# Patient Record
Sex: MALE | Race: WHITE | NOT HISPANIC OR LATINO | Employment: FULL TIME | URBAN - METROPOLITAN AREA
[De-identification: names, ages, dates, MRNs, and addresses within clinical notes are randomized per-mention and may not be internally consistent; named-entity substitution may affect disease eponyms.]

---

## 2018-09-06 ENCOUNTER — OFFICE VISIT (OUTPATIENT)
Dept: NEUROLOGY | Facility: CLINIC | Age: 23
End: 2018-09-06

## 2018-09-06 VITALS
SYSTOLIC BLOOD PRESSURE: 170 MMHG | OXYGEN SATURATION: 98 % | WEIGHT: 234 LBS | DIASTOLIC BLOOD PRESSURE: 99 MMHG | HEIGHT: 71 IN | BODY MASS INDEX: 32.76 KG/M2 | HEART RATE: 64 BPM

## 2018-09-06 DIAGNOSIS — R55 SYNCOPE AND COLLAPSE: Primary | ICD-10-CM

## 2018-09-06 PROCEDURE — 99243 OFF/OP CNSLTJ NEW/EST LOW 30: CPT | Performed by: PSYCHIATRY & NEUROLOGY

## 2018-09-06 RX ORDER — TRAZODONE HYDROCHLORIDE 50 MG/1
TABLET ORAL
COMMUNITY
Start: 2018-08-29 | End: 2018-09-18

## 2018-09-06 RX ORDER — CETIRIZINE HYDROCHLORIDE 10 MG/1
10 TABLET ORAL DAILY
COMMUNITY
End: 2018-09-18

## 2018-09-06 NOTE — PROGRESS NOTES
Subjective:     Patient ID: Cory Farias is a 23 y.o. male.    History of Present Illness     The patient is a 23-year-old right-handed young gentleman who was seen for further evaluation of episodes of passing out. The patient was seen today in consultation per the request of Dr. Healy.   He's had 3 episodes over the past few months.  The first occurred in May or June.  He was at his desk at work when he suddenly had some tunnel vision followed by hitting the floor this was witnessed and he was apparently out for just 2-3 seconds.  He felt fine afterwards.  There was no tongue biting or urinary incontinence.  The second episode occurred 2 weeks later.  He was in the shower and the same thing happened exactly.  The third episode occurred while he was running in [place] he hit the ground on that occasion he was out only briefly.  He's not had any neurodiagnostic studies.  With the episodes there was no shortness of breath palpitations or chest pain.  The following portions of the patient's history were reviewed and updated as appropriate: allergies, current medications, past family history, past medical history, past social history, past surgical history and problem list.    Family History   Problem Relation Age of Onset   • Arthritis Mother    • Hypertension Father    • Hypertension Sister    • Dementia Maternal Grandmother    • Thyroid disease Maternal Grandmother    • Hypertension Maternal Grandfather    • Hypertension Paternal Grandmother    • Hypertension Paternal Grandfather      Active Ambulatory Problems     Diagnosis Date Noted   • Syncope and collapse 09/06/2018     Resolved Ambulatory Problems     Diagnosis Date Noted   • No Resolved Ambulatory Problems     Past Medical History:   Diagnosis Date   • Head injury    • Seasonal allergies      Social History     Social History   • Marital status: Single     Spouse name: N/A   • Number of children: N/A   • Years of education: N/A     Occupational  History   • Not on file.     Social History Main Topics   • Smoking status: Never Smoker   • Smokeless tobacco: Current User     Types: Snuff   • Alcohol use Yes   • Drug use: Unknown   • Sexual activity: Not on file     Other Topics Concern   • Not on file     Social History Narrative   • No narrative on file       Current Outpatient Prescriptions:   •  cetirizine (zyrTEC) 10 MG tablet, Take 10 mg by mouth Daily., Disp: , Rfl:   •  traZODone (DESYREL) 50 MG tablet, , Disp: , Rfl:     Review of Systems   Constitutional: Negative.    HENT: Positive for rhinorrhea, sinus pain and sinus pressure. Negative for congestion, dental problem, drooling, ear discharge, ear pain, facial swelling, hearing loss, mouth sores, nosebleeds, postnasal drip, sneezing, sore throat, tinnitus, trouble swallowing and voice change.    Eyes: Positive for itching. Negative for photophobia, pain, discharge, redness and visual disturbance.   Respiratory: Negative.    Cardiovascular: Negative.    Endocrine: Negative.    Musculoskeletal: Negative.    Skin: Negative.    Allergic/Immunologic: Positive for environmental allergies and food allergies. Negative for immunocompromised state.   Neurological: Positive for dizziness. Negative for tremors, seizures, syncope, facial asymmetry, speech difficulty, weakness, light-headedness, numbness and headaches.   Hematological: Negative.    Psychiatric/Behavioral: Positive for sleep disturbance. Negative for agitation, behavioral problems, confusion, decreased concentration, dysphoric mood, hallucinations, self-injury and suicidal ideas. The patient is not nervous/anxious and is not hyperactive.         Objective:    Neurologic Exam  General appearance is normal. Mental status showed normal orientation, memory, attention span and concentration, language, and fund of knowledge for age.    Cranial nerve exam- funduscopy, eye movements, pupillary reflexes, muscles of mastication, facial musculature, hearing,  palatal movement, shoulder shrug and tongue protrusion were all normal.    Sensory examination was normal.  Deep tendon reflexes were 2+ and symmetric.    No pathologic reflexes were noted.  Cerebellar function was normal.  No focal weakness was noted.  Tone was normal.  Gait and station were normal.  No edema was noted.      Physical Exam    Assessment/Plan:     Cory was seen today for syncope.    Diagnoses and all orders for this visit:    Syncope and collapse  -     EEG (Hospital Performed); Future  -     Ambulatory Referral to Cardiology       The patient has had 3 episodes of presumed syncope.  I am skeptical that there is a neurological cause.  I will set up an EEG.  Also I've referred him to cardiology is a me needs at least an echo and Holter.  I sent him to [facility] in [place].  He is moving to [place].  He will call me following the EEG.  Neurologic examination is normal.  I placed him on no medicine. Thank you for allowing me to share in the care of this patient.  Rafita Joiner M.D.

## 2018-09-07 ENCOUNTER — TELEPHONE (OUTPATIENT)
Dept: NEUROLOGY | Facility: CLINIC | Age: 23
End: 2018-09-07

## 2018-09-07 DIAGNOSIS — R55 SYNCOPE AND COLLAPSE: Primary | ICD-10-CM

## 2018-09-07 NOTE — TELEPHONE ENCOUNTER
----- Message from Irina Gustafson sent at 9/7/2018  9:26 AM EDT -----  Contact: PT  Cory was in to see Dr. Joiner, he put in a referral for pt to see a cardiologist in [place] but put it in for [facility].  Please have Dr. Joiner to correct it and call pt at [phone]

## 2018-09-12 ENCOUNTER — HOSPITAL ENCOUNTER (OUTPATIENT)
Dept: NEUROLOGY | Facility: HOSPITAL | Age: 23
Discharge: HOME | End: 2018-09-12
Attending: PSYCHIATRY & NEUROLOGY | Admitting: PSYCHIATRY & NEUROLOGY

## 2018-09-12 DIAGNOSIS — R55 SYNCOPE AND COLLAPSE: ICD-10-CM

## 2018-09-12 PROCEDURE — 95816 EEG AWAKE AND DROWSY: CPT | Performed by: PSYCHIATRY & NEUROLOGY

## 2018-09-12 PROCEDURE — 95816 EEG AWAKE AND DROWSY: CPT

## 2018-09-17 ENCOUNTER — TELEPHONE (OUTPATIENT)
Dept: CARDIOLOGY | Facility: CLINIC | Age: 23
End: 2018-09-17

## 2018-09-17 NOTE — PROGRESS NOTES
[facility] at [facility]  Consultation History and Physical  Cory Farias  1995  [phone]  VISIT DATE:  18    PCP:   Jun Healy MD  [address] [place] [address]  [facility] [id number]      CC:  Evaluation of syncope  Problem List:    History of Present Illness:  Cory Farias  Is a 23 y.o. male with pertinent cardiac history detailed above.  The patient has a history of 3 total episodes of syncope over the last few months.  He seen neurology and an EEG is pending.  Patient states that 3 episodes of occurred since April.  The first occurred while he was sitting and eating lunch with a coworker he felt up very brief sensation of tunnel vision lasting only a few seconds, then had loss of consciousness, he states for only a few seconds.  The second episode occurred while in the shower he had been in the shower for approximately 5 minutes, had a very brief sensation of tunnel vision and again had loss of consciousness.  The most concerning episode occurred approximately 2 months ago, while running, he had no prodrome, and fell injuring his leg and arm.  A bystander saw him, and came over him quickly, and reported he was only out for a few seconds.  He denies any chest pain or shortness of breath.  His EKG in the office today was abnormal and that has a vertical axis, with some T-wave inversion in 3 and aVF.  There is no evidence of Brugada syndrome, QT is normal, no delta wave visible.  Family history of syncope or sudden cardiac death.  He states his mom has mitral valve prolapse.  He states he has an uncle who  in his 50s from a reported MI      Patient Active Problem List    Diagnosis Date Noted   • Syncope and collapse 2018       Allergies   Allergen Reactions   • Trazodone Other (See Comments)     PATIENT SAYS MADE HIS HEART RACE REALLY BAD       Social History     Social History   • Marital status: Single     Spouse name: N/A   •  "Number of children: N/A   • Years of education: N/A     Occupational History   • Not on file.     Social History Main Topics   • Smoking status: Never Smoker   • Smokeless tobacco: Current User     Types: Snuff   • Alcohol use Yes      Comment: social   • Drug use: No   • Sexual activity: Defer     Other Topics Concern   • Not on file     Social History Narrative   • No narrative on file       Family History   Problem Relation Age of Onset   • Arthritis Mother    • Hypertension Father    • Hypertension Sister    • Dementia Maternal Grandmother    • Thyroid disease Maternal Grandmother    • Hypertension Maternal Grandfather    • Hypertension Paternal Grandmother    • Hypertension Paternal Grandfather        Current Medications:    Current Outpatient Prescriptions:   •  fexofenadine (ALLEGRA) 180 MG tablet, Take 180 mg by mouth Daily., Disp: , Rfl:      Review of Systems   Cardiovascular: Positive for syncope. Negative for chest pain and palpitations.   Respiratory: Negative for shortness of breath.    Neurological: Negative for seizures.   All other systems reviewed and are negative.      Vitals:    09/18/18 0904 09/18/18 0914   BP: 124/74 110/72   BP Location: Left arm Left arm   Patient Position: Sitting Standing   Pulse: 76 78   Weight: 106 kg (234 lb)    Height: 180.3 cm (71\")        Physical Exam   Constitutional: He is oriented to person, place, and time. He appears well-developed and well-nourished.   HENT:   Head: Normocephalic and atraumatic.   Eyes: Pupils are equal, round, and reactive to light. EOM are normal.   Neck: Normal range of motion. Neck supple.   Cardiovascular: Normal rate, regular rhythm and normal heart sounds.  Exam reveals no gallop and no friction rub.    No murmur heard.  Pulmonary/Chest: Effort normal and breath sounds normal.   Abdominal: Soft. Bowel sounds are normal. There is no tenderness.   Musculoskeletal: Normal range of motion. He exhibits no edema.   Neurological: He is alert " and oriented to person, place, and time.   Skin: Skin is warm and dry.   Psychiatric: He has a normal mood and affect.       Diagnostic Data:    ECG 12 Lead  Date/Time: 9/18/2018 9:27 AM  Performed by: FAUSTINA OLSON  Authorized by: FAUSTINA OLSON   Previous ECG: no previous ECG available  Rhythm: sinus rhythm  Rate: normal  BPM: 86  T depression: II and III  QRS axis: vertical axis.  Clinical impression: abnormal ECG          No results found for: CHLPL, TRIG, HDL, LDLDIRECT  No results found for: GLUCOSE, BUN, CREATININE, NA, K, CL, CO2, CREATININE, BUN  No results found for: HGBA1C  No results found for: WBC, HGB, HCT, PLT    Assessment:  No diagnosis found.    Plan:      Syncope  -3 episodes, and approximately the last 5 months  -Some concern for cardiac etiology given minimal warning symptoms, and 1 episode with exertion  -EKG shows a vertical axis, but otherwise no evidence of underlying channelopathy or otherr obvious cardiac cause of syncope  -will obtain echo, place 30 day monitor, and order GXT to evaluate for inducible arrhythmias  -follow up approximately 1 month          Faustina Olson MD

## 2018-09-17 NOTE — TELEPHONE ENCOUNTER
Spoke with patient about consult appointment on 09/18/2019 @ 9:00am. Told patient to arrive 30 minutes prior to appointment and bring all medications or list of medications with him.    Patient verbalized understanding.

## 2018-09-18 ENCOUNTER — CONSULT (OUTPATIENT)
Dept: CARDIOLOGY | Facility: CLINIC | Age: 23
End: 2018-09-18

## 2018-09-18 VITALS
BODY MASS INDEX: 32.76 KG/M2 | HEART RATE: 78 BPM | SYSTOLIC BLOOD PRESSURE: 110 MMHG | DIASTOLIC BLOOD PRESSURE: 72 MMHG | WEIGHT: 234 LBS | HEIGHT: 71 IN

## 2018-09-18 DIAGNOSIS — R55 SYNCOPE, UNSPECIFIED SYNCOPE TYPE: Primary | ICD-10-CM

## 2018-09-18 PROCEDURE — 93000 ELECTROCARDIOGRAM COMPLETE: CPT | Performed by: INTERNAL MEDICINE

## 2018-09-18 PROCEDURE — 99244 OFF/OP CNSLTJ NEW/EST MOD 40: CPT | Performed by: INTERNAL MEDICINE

## 2018-09-18 RX ORDER — FEXOFENADINE HCL 180 MG/1
180 TABLET ORAL DAILY
COMMUNITY
End: 2021-04-13

## 2018-10-16 ENCOUNTER — HOSPITAL ENCOUNTER (OUTPATIENT)
Dept: CARDIOLOGY | Facility: HOSPITAL | Age: 23
Discharge: HOME | End: 2018-10-16
Attending: INTERNAL MEDICINE | Admitting: INTERNAL MEDICINE

## 2018-10-16 ENCOUNTER — HOSPITAL ENCOUNTER (OUTPATIENT)
Dept: CARDIOLOGY | Facility: HOSPITAL | Age: 23
Discharge: HOME | End: 2018-10-16
Attending: INTERNAL MEDICINE

## 2018-10-16 VITALS
WEIGHT: 234 LBS | BODY MASS INDEX: 32.76 KG/M2 | HEIGHT: 71 IN | SYSTOLIC BLOOD PRESSURE: 110 MMHG | DIASTOLIC BLOOD PRESSURE: 72 MMHG

## 2018-10-16 DIAGNOSIS — R55 SYNCOPE, UNSPECIFIED SYNCOPE TYPE: ICD-10-CM

## 2018-10-16 LAB
BH CV ECHO MEAS - AO MAX PG (FULL): 2.4 MMHG
BH CV ECHO MEAS - AO MAX PG: 7 MMHG
BH CV ECHO MEAS - AO MEAN PG (FULL): 1.5 MMHG
BH CV ECHO MEAS - AO MEAN PG: 3.7 MMHG
BH CV ECHO MEAS - AO ROOT AREA (BSA CORRECTED): 1.3
BH CV ECHO MEAS - AO ROOT AREA: 6.6 CM^2
BH CV ECHO MEAS - AO ROOT DIAM: 2.9 CM
BH CV ECHO MEAS - AO V2 MAX: 127.6 CM/SEC
BH CV ECHO MEAS - AO V2 MEAN: 89.3 CM/SEC
BH CV ECHO MEAS - AO V2 VTI: 23.9 CM
BH CV ECHO MEAS - AVA(I,A): 3.5 CM^2
BH CV ECHO MEAS - AVA(I,D): 3.5 CM^2
BH CV ECHO MEAS - AVA(V,A): 3.5 CM^2
BH CV ECHO MEAS - AVA(V,D): 3.5 CM^2
BH CV ECHO MEAS - BSA(HAYCOCK): 2.3 M^2
BH CV ECHO MEAS - BSA: 2.3 M^2
BH CV ECHO MEAS - BZI_BMI: 32.6 KILOGRAMS/M^2
BH CV ECHO MEAS - BZI_METRIC_HEIGHT: 180.3 CM
BH CV ECHO MEAS - BZI_METRIC_WEIGHT: 106.1 KG
BH CV ECHO MEAS - EDV(CUBED): 161.2 ML
BH CV ECHO MEAS - EDV(MOD-SP4): 146 ML
BH CV ECHO MEAS - EDV(TEICH): 143.9 ML
BH CV ECHO MEAS - EF(CUBED): 65 %
BH CV ECHO MEAS - EF(MOD-SP4): 52.1 %
BH CV ECHO MEAS - EF(TEICH): 55.9 %
BH CV ECHO MEAS - ESV(CUBED): 56.5 ML
BH CV ECHO MEAS - ESV(MOD-SP4): 70 ML
BH CV ECHO MEAS - ESV(TEICH): 63.4 ML
BH CV ECHO MEAS - FS: 29.5 %
BH CV ECHO MEAS - IVS/LVPW: 1.1
BH CV ECHO MEAS - IVSD: 1.1 CM
BH CV ECHO MEAS - LA DIMENSION: 4 CM
BH CV ECHO MEAS - LA/AO: 1.4
BH CV ECHO MEAS - LAD MAJOR: 5.9 CM
BH CV ECHO MEAS - LAT PEAK E' VEL: 16.7 CM/SEC
BH CV ECHO MEAS - LATERAL E/E' RATIO: 4
BH CV ECHO MEAS - LV DIASTOLIC VOL/BSA (35-75): 64.8 ML/M^2
BH CV ECHO MEAS - LV MASS(C)D: 227.7 GRAMS
BH CV ECHO MEAS - LV MASS(C)DI: 101 GRAMS/M^2
BH CV ECHO MEAS - LV MAX PG: 4.6 MMHG
BH CV ECHO MEAS - LV MEAN PG: 2.1 MMHG
BH CV ECHO MEAS - LV SYSTOLIC VOL/BSA (12-30): 31.1 ML/M^2
BH CV ECHO MEAS - LV V1 MAX: 106.9 CM/SEC
BH CV ECHO MEAS - LV V1 MEAN: 66.2 CM/SEC
BH CV ECHO MEAS - LV V1 VTI: 20.3 CM
BH CV ECHO MEAS - LVIDD: 5.4 CM
BH CV ECHO MEAS - LVIDS: 3.8 CM
BH CV ECHO MEAS - LVLD AP4: 10.3 CM
BH CV ECHO MEAS - LVLS AP4: 8.6 CM
BH CV ECHO MEAS - LVOT AREA (M): 4.2 CM^2
BH CV ECHO MEAS - LVOT AREA: 4.2 CM^2
BH CV ECHO MEAS - LVOT DIAM: 2.3 CM
BH CV ECHO MEAS - LVPWD: 1 CM
BH CV ECHO MEAS - MED PEAK E' VEL: 9.6 CM/SEC
BH CV ECHO MEAS - MEDIAL E/E' RATIO: 6.9
BH CV ECHO MEAS - MV A MAX VEL: 58 CM/SEC
BH CV ECHO MEAS - MV DEC TIME: 0.19 SEC
BH CV ECHO MEAS - MV E MAX VEL: 67.5 CM/SEC
BH CV ECHO MEAS - MV E/A: 1.2
BH CV ECHO MEAS - PA ACC SLOPE: 654.8 CM/SEC^2
BH CV ECHO MEAS - PA ACC TIME: 0.15 SEC
BH CV ECHO MEAS - PA MAX PG: 1.8 MMHG
BH CV ECHO MEAS - PA PR(ACCEL): 9.3 MMHG
BH CV ECHO MEAS - PA V2 MAX: 47.5 CM/SEC
BH CV ECHO MEAS - PI END-D VEL: 95.6 CM/SEC
BH CV ECHO MEAS - RAP SYSTOLE: 3 MMHG
BH CV ECHO MEAS - RVSP: 14 MMHG
BH CV ECHO MEAS - SI(AO): 69.8 ML/M^2
BH CV ECHO MEAS - SI(CUBED): 46.5 ML/M^2
BH CV ECHO MEAS - SI(LVOT): 37.3 ML/M^2
BH CV ECHO MEAS - SI(MOD-SP4): 33.7 ML/M^2
BH CV ECHO MEAS - SI(TEICH): 35.7 ML/M^2
BH CV ECHO MEAS - SV(AO): 157.3 ML
BH CV ECHO MEAS - SV(CUBED): 104.8 ML
BH CV ECHO MEAS - SV(LVOT): 84.2 ML
BH CV ECHO MEAS - SV(MOD-SP4): 76 ML
BH CV ECHO MEAS - SV(TEICH): 80.5 ML
BH CV ECHO MEAS - TAPSE (>1.6): 2.1 CM2
BH CV ECHO MEAS - TR MAX PG: 11 MMHG
BH CV ECHO MEAS - TR MAX VEL: 162.2 CM/SEC
BH CV ECHO MEASUREMENTS AVERAGE E/E' RATIO: 5.13
BH CV STRESS BP STAGE 1: NORMAL
BH CV STRESS BP STAGE 2: NORMAL
BH CV STRESS BP STAGE 3: NORMAL
BH CV STRESS BP STAGE 4: NORMAL
BH CV STRESS BP STAGE 5: NORMAL
BH CV STRESS DURATION MIN STAGE 1: 3
BH CV STRESS DURATION MIN STAGE 2: 3
BH CV STRESS DURATION MIN STAGE 3: 3
BH CV STRESS DURATION MIN STAGE 4: 3
BH CV STRESS DURATION MIN STAGE 5: 1
BH CV STRESS DURATION SEC STAGE 1: 0
BH CV STRESS DURATION SEC STAGE 2: 0
BH CV STRESS DURATION SEC STAGE 3: 0
BH CV STRESS DURATION SEC STAGE 4: 0
BH CV STRESS DURATION SEC STAGE 5: 0
BH CV STRESS GRADE STAGE 1: 10
BH CV STRESS GRADE STAGE 2: 12
BH CV STRESS GRADE STAGE 3: 14
BH CV STRESS GRADE STAGE 4: 16
BH CV STRESS GRADE STAGE 5: 18
BH CV STRESS HR STAGE 1: 101
BH CV STRESS HR STAGE 2: 114
BH CV STRESS HR STAGE 3: 141
BH CV STRESS HR STAGE 4: 181
BH CV STRESS HR STAGE 5: 190
BH CV STRESS METS STAGE 1: 5
BH CV STRESS METS STAGE 2: 7.5
BH CV STRESS METS STAGE 3: 10
BH CV STRESS METS STAGE 4: 13.5
BH CV STRESS METS STAGE 5: 15
BH CV STRESS PROTOCOL 1: NORMAL
BH CV STRESS RECOVERY BP: NORMAL MMHG
BH CV STRESS RECOVERY HR: 109 BPM
BH CV STRESS SPEED STAGE 1: 1.7
BH CV STRESS SPEED STAGE 2: 2.5
BH CV STRESS SPEED STAGE 3: 3.4
BH CV STRESS SPEED STAGE 4: 4.2
BH CV STRESS SPEED STAGE 5: 5
BH CV STRESS STAGE 1: 1
BH CV STRESS STAGE 2: 2
BH CV STRESS STAGE 3: 3
BH CV STRESS STAGE 4: 4
BH CV STRESS STAGE 5: 5
BH CV VAS BP RIGHT ARM: NORMAL MMHG
BH CV XLRA - RV BASE: 3.1 CM
BH CV XLRA - RV LENGTH: 8.5 CM
BH CV XLRA - RV MID: 3.2 CM
BH CV XLRA - TDI S': 12.4 CM/SEC
LEFT ATRIUM VOLUME INDEX: 25.7 ML/M^2
MAXIMAL PREDICTED HEART RATE: 195 BPM
PERCENT MAX PREDICTED HR: 97.44 %
STRESS BASELINE BP: NORMAL MMHG
STRESS BASELINE HR: 87 BPM
STRESS PERCENT HR: 115 %
STRESS POST ESTIMATED WORKLOAD: 15.3 METS
STRESS POST EXERCISE DUR MIN: 13 MIN
STRESS POST EXERCISE DUR SEC: 0 SEC
STRESS POST PEAK BP: NORMAL MMHG
STRESS POST PEAK HR: 190 BPM
STRESS TARGET HR: 166 BPM

## 2018-10-16 PROCEDURE — 93018 CV STRESS TEST I&R ONLY: CPT | Performed by: INTERNAL MEDICINE

## 2018-10-16 PROCEDURE — 93306 TTE W/DOPPLER COMPLETE: CPT

## 2018-10-16 PROCEDURE — 93306 TTE W/DOPPLER COMPLETE: CPT | Performed by: INTERNAL MEDICINE

## 2018-10-16 PROCEDURE — 93017 CV STRESS TEST TRACING ONLY: CPT

## 2018-10-18 ENCOUNTER — TELEPHONE (OUTPATIENT)
Dept: CARDIOLOGY | Facility: CLINIC | Age: 23
End: 2018-10-18

## 2018-10-26 NOTE — PROGRESS NOTES
[facility] at [facility]  Follow Up Visit  Cory Farias  1995  [phone]  VISIT DATE:  10/30/18    PCP:   Jun Healy MD  [address] [place] [address]  [facility] [address]      CC:  Follow-up syncope    Pertinent test results:    30 day MCOT:  · A normal monitor study.  · Episodes of sinus tachycardia which were auto triggered  · No symptomatic events.    Echo:  · Left ventricular systolic function is normal.  · Estimated EF appears to be in the range of 51 - 55%.  · Normal right ventricular cavity size, wall thickness, systolic function and septal motion noted.  · No outflow tract gradient.  · No evidence of coarctation.  · No significant valvular disease identified.    GXT:  · THR of 165 achieved at 10:53, max hr 190, 97% of MPHR  · Expected exercise time 14:10, actual time 13 min, test stopped at patient request due to fatigue  · ESTEFANY +9, BMI 32.6  · There was no ECG evidence of inducible myocardial ischemia  · Patient had hypertensive response to exercise, diastolic blood pressure reached 102 mmHg.  · There were no inducible arrhythmias with stress  · Low risk study, Duke treadmill score 13  · Negative for ischemia      History of Present Illness:  Cory Farias  Is a 23 y.o. male with pertinent cardiac history detailed above.  Is here to follow-up on syncope    .The patient has a history of 3 total episodes of syncope over the last few months.  He seen neurology and an EEG is pending.  Patient states that 3 episodes of occurred since April.  The first occurred while he was sitting and eating lunch with a coworker he felt up very brief sensation of tunnel vision lasting only a few seconds, then had loss of consciousness, he states for only a few seconds.  The second episode occurred while in the shower he had been in the shower for approximately 5 minutes, had a very brief sensation of tunnel vision and again had loss of consciousness.  The most  concerning episode occurred approximately 2 months ago, while running, he had no prodrome, and fell injuring his leg and arm.  A bystander saw him, and came over him quickly, and reported he was only out for a few seconds.  He denies any chest pain or shortness of breath.  His EKG in the office today was abnormal and that has a vertical axis, with some T-wave inversion in 3 and aVF.  There is no evidence of Brugada syndrome, QT is normal, no delta wave visible.  No Family history of syncope or sudden cardiac death.  He states his mom has mitral valve prolapse.  He states he has an uncle who  in his 50s from a reported MI    Workup as detailed above has been largely negative.  The GXT showed no evidence of ischemia, no inducible arrhythmias, he was low risk, with a Duke treadmill score 13.  Echo showed structurally normal heart with no etiology for syncope, and the 30 day monitor showed only sinus tachycardia.  He does describe on some sensation of rapid heartbeats and sensation of his heart beating up into his neck.  He associates this with times of increased stress particularly at night.  He states during the time of his 30 day telemetry monitor he had not been working out, so he attributes that episodes of sinus tachycardia to increased stress.  He has had no syncope since our last visit          Patient Active Problem List    Diagnosis Date Noted   • Syncope and collapse 2018       Allergies   Allergen Reactions   • Trazodone Other (See Comments)     PATIENT SAYS MADE HIS HEART RACE REALLY BAD       Social History     Social History   • Marital status: Single     Spouse name: N/A   • Number of children: N/A   • Years of education: N/A     Occupational History   • Not on file.     Social History Main Topics   • Smoking status: Never Smoker   • Smokeless tobacco: Current User     Types: Snuff   • Alcohol use Yes      Comment: social   • Drug use: No   • Sexual activity: Defer     Other Topics Concern   •  "Not on file     Social History Narrative   • No narrative on file       Family History   Problem Relation Age of Onset   • Arthritis Mother    • Hypertension Father    • Hypertension Sister    • Dementia Maternal Grandmother    • Thyroid disease Maternal Grandmother    • Hypertension Maternal Grandfather    • Hypertension Paternal Grandmother    • Hypertension Paternal Grandfather        Current Medications:    Current Outpatient Prescriptions:   •  fexofenadine (ALLEGRA) 180 MG tablet, Take 180 mg by mouth Daily., Disp: , Rfl:   •  metoprolol succinate XL (TOPROL-XL) 25 MG 24 hr tablet, Take 1 tablet by mouth Daily., Disp: 30 tablet, Rfl: 11     Review of Systems   Cardiovascular: Negative for chest pain, palpitations and syncope.   Respiratory: Negative for shortness of breath.    All other systems reviewed and are negative.      Vitals:    10/30/18 0855   BP: 124/80   BP Location: Right arm   Patient Position: Sitting   Pulse: 77   SpO2: 98%   Weight: 111 kg (244 lb)   Height: 180.3 cm (71\")       Physical Exam   Constitutional: He is oriented to person, place, and time. He appears well-developed and well-nourished.   HENT:   Head: Normocephalic and atraumatic.   Neck: Neck supple. No JVD present.   Cardiovascular: Normal rate, regular rhythm, normal heart sounds and intact distal pulses.  Exam reveals no gallop and no friction rub.    No murmur heard.  Pulmonary/Chest: Effort normal and breath sounds normal.   Abdominal: Soft. Bowel sounds are normal.   Musculoskeletal: Normal range of motion. He exhibits no edema.   Neurological: He is alert and oriented to person, place, and time.   Skin: Skin is warm and dry.   Psychiatric: He has a normal mood and affect.   Vitals reviewed.      Diagnostic Data:  Procedures  No results found for: CHLPL, TRIG, HDL, LDLDIRECT  No results found for: GLUCOSE, BUN, CREATININE, NA, K, CL, CO2, CREATININE, BUN  No results found for: HGBA1C  No results found for: WBC, HGB, HCT, " PLT    Assessment:   Diagnosis Plan   1. Syncope, unspecified syncope type         Plan:        Syncope  -3 episodes within a 5 month period prior to our last visit  -none since, also has not been exercising  -Echo shows structurally normal heart, with no valvular disease, evidence of HOCM, evidence of coarctation  -GXT was low risk, Duke treadmill score 13, argues against C PVT, anomalous coronaries, other exercise-induced arrhythmias  -30 day MCOT shows only sinus tachycardia  -Patient did not report excessive exertion during the 30 days while he was wearing his monitor, he sat self limited exercise, but still had some symptomatic palpitations and episodes of heart racing.  He reports some increased baseline stress, and these may be anxiety mediated.  He states these episodes of rapid heart rates are symptomatically bothersome.,  -Given these complaints, we'll start Toprol-XL 25 mg daily, discussed potential side effects, with patient, and asked him to call us if they are intolerable, and we can try alternative medications or stop.  I told himr that I thought he could resume exercise gradually, and to please alert us if he's having recurrent syncope.  In that he may need additional tests such as an implantable loop recorder, potential additional cardiac imaging.    -will schedule 6 month follow up, sooner as needed            Magdaleno Stafford MD

## 2018-10-30 ENCOUNTER — OFFICE VISIT (OUTPATIENT)
Dept: CARDIOLOGY | Facility: CLINIC | Age: 23
End: 2018-10-30

## 2018-10-30 VITALS
HEIGHT: 71 IN | SYSTOLIC BLOOD PRESSURE: 124 MMHG | BODY MASS INDEX: 34.16 KG/M2 | OXYGEN SATURATION: 98 % | WEIGHT: 244 LBS | DIASTOLIC BLOOD PRESSURE: 80 MMHG | HEART RATE: 77 BPM

## 2018-10-30 DIAGNOSIS — R55 SYNCOPE, UNSPECIFIED SYNCOPE TYPE: Primary | ICD-10-CM

## 2018-10-30 PROCEDURE — 99213 OFFICE O/P EST LOW 20 MIN: CPT | Performed by: INTERNAL MEDICINE

## 2018-10-30 RX ORDER — METOPROLOL SUCCINATE 25 MG/1
25 TABLET, EXTENDED RELEASE ORAL DAILY
Qty: 30 TABLET | Refills: 11 | OUTPATIENT
Start: 2018-10-30 | End: 2021-04-13

## 2019-05-06 ENCOUNTER — TELEPHONE (OUTPATIENT)
Dept: CARDIOLOGY | Facility: CLINIC | Age: 24
End: 2019-05-06

## 2019-05-06 NOTE — TELEPHONE ENCOUNTER
Called patient to remind them of their appointment on 05/08/2019 @ 3052. Voicemail mailbox was full

## 2020-06-24 ENCOUNTER — CLINICAL SUPPORT (OUTPATIENT)
Dept: GENETICS | Facility: HOSPITAL | Age: 25
End: 2020-06-24

## 2020-06-24 ENCOUNTER — LAB (OUTPATIENT)
Dept: LAB | Facility: HOSPITAL | Age: 25
End: 2020-06-24

## 2020-06-24 DIAGNOSIS — Z31.441 ENCOUNTER FOR TESTING OF MALE PARTNER OF PATIENT WITH RECURRENT PREGNANCY LOSS: ICD-10-CM

## 2020-06-24 DIAGNOSIS — Z13.79 GENETIC TESTING: Primary | ICD-10-CM

## 2020-06-25 NOTE — PROGRESS NOTES
Cory Farias is a 25-year-old male that was referred to genetic counseling due to his wife’s history of recurrent pregnancy loss. His wife, Rosemary Farias, recently had two miscarriages within the last year. His wife was seen for genetic counseling on 6/19/2020 and pursued chromosome analysis. Mr. 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  Results are expected in 2-3 weeks.     Family History: (See pedigree) Ms. Farias has had two miscarriages that occurred in the first trimester. Ms. Farias’s mother has a history of one miscarriage and her maternal first cousin has a son who has Down syndrome. Mr. Erickson’s brother’s wife has had two miscarriages and his sister has had four miscarriages. His mother had a stillbirth at 39 weeks gestation. The family history is otherwise negative for stillbirths, intellectual disability, known genetic conditions, birth defects, or chromosome abnormalities.     Genetic Counseling: (10 minutes) We reviewed the current information on miscarriage and aneuploidy.  Approximately 15-20% of all recognized pregnancies will end in miscarriage.  Half of these miscarriages are attributed to chromosome abnormalities, with a trisomy occurring in more than 50% of chromosomally abnormal miscarriages. We explained chromosomes and changes in chromosome number.  Chromosomes are the packages of information that provide instructions for how our bodies should grow and develop.  Most people have a total of 46 chromosomes arranged into 23 pairs; one copy of each pair comes from the mother and the other from the father.  Approximately 1 in 500 individuals carries a balanced translocation.  This is when a section from one chromosome of a particular pair changes places with a section from a chromosome of another pair. When the two breakpoints do not interrupt a gene and there is no gain or loss of  material from either chromosome it is called a balanced translocation. Someone with a balanced translocation (a carrier) typically has no health or developmental problems associated with the translocation, although they may have difficulties with miscarriage or fertility, and may have a higher risk of a child born with an unbalanced chromosome complement. Approximately 3-5% of recurrent pregnancy loss are caused by a chromosome abnormality resulting from a translocation inherited from a parent.  Mr. Farias was interested in pursuing testing to evaluate for this possibility.  We discussed that if either set of parental chromosomes are found to have a translocation, we would be able to have a conversation regarding the estimated risk of miscarriage and risk of an affected liveborn child based on the specific translocation.     Genetic testing: Mr. Farias opted to pursue chromosome (karyotype) analysis through [facility] to determine if he is a balanced translocation carrier.  His wife is also currently awaiting results from chromosome analysis.    Plan: Test results are expected in 2-3 weeks. Mr. Farias will be contacted at that time to discuss results.  In the meantime, he is welcome to contact us at [phone] with any questions he may have.        Mayra Linn, MS, [facility] [place] [facility]  Licensed Certified Genetic Counselor

## 2020-07-09 ENCOUNTER — DOCUMENTATION (OUTPATIENT)
Dept: GENETICS | Facility: HOSPITAL | Age: 25
End: 2020-07-09

## 2020-07-09 NOTE — PROGRESS NOTES
Cory Farias is a 25-year-old male that was referred to genetic counseling due to his wife’s history of recurrent pregnancy loss. His wife, Rosemary Farias, recently had two miscarriages within the last year. His wife was seen for genetic counseling on 6/19/2020 and pursued chromosome analysis. Mr. 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 Chromosome analysis was normal for Mr. Farias and his wife. These normal results were discussed with Ms. Farias and Mr. Farias on 7/6/2020 and 7/9/2020.      Family History: (See pedigree) Ms. Farias has had two miscarriages that occurred in the first trimester. Ms. Farias’s mother has a history of one miscarriage and her maternal first cousin has a son who has Down syndrome. Mr. Erickson’s brother’s wife has had two miscarriages and his sister has had three miscarriages. His mother had a stillbirth at 39 weeks gestation. The family history is otherwise negative for stillbirths, intellectual disability, known genetic conditions, birth defects, or chromosome abnormalities.     Genetic Counseling:  We reviewed the current information on miscarriage and aneuploidy.  Approximately 15-20% of all recognized pregnancies will end in miscarriage.  Half of these miscarriages are attributed to chromosome abnormalities, with a trisomy occurring in more than 50% of chromosomally abnormal miscarriages. We explained chromosomes and changes in chromosome number.  Chromosomes are the packages of information that provide instructions for how our bodies should grow and develop.  Most people have a total of 46 chromosomes arranged into 23 pairs; one copy of each pair comes from the mother and the other from the father.  Approximately 1 in 500 individuals carries a balanced translocation.  This is when a section from one chromosome of a particular pair changes places with a section from a  chromosome of another pair. When the two breakpoints do not interrupt a gene and there is no gain or loss of material from either chromosome it is called a balanced translocation. Someone with a balanced translocation (a carrier) typically has no health or developmental problems associated with the translocation, although they may have difficulties with miscarriage or fertility, and may have a higher risk of a child born with an unbalanced chromosome complement. Approximately 3-5% of recurrent pregnancy loss are caused by a chromosome abnormality resulting from a translocation inherited from a parent.  Mr. Farias was interested in pursuing testing to evaluate for this possibility.  We discussed that if either set of parental chromosomes are found to have a translocation, we would be able to have a conversation regarding the estimated risk of miscarriage and risk of an affected liveborn child based on the specific translocation.     Genetic testing: Mr. Farias opted to pursue chromosome (karyotype) analysis through [facility] to determine if he is a balanced translocation carrier.  His wife also had chromosome analysis testing ordered a few days before Mr. Farias.     Test Results: Mr. Farias and his wife both underwent chromosome analysis.  Mr. Farias’s results showed 46,XY, normal male.  Ms. Farias’s results showed 46,XX, normal female.  These results rule out an inherited chromosomal translocation that could be contributing to Ms. Farias’s miscarriages.    Plan: Genetic counseling remains available to  and Ms. Farias. We encourage them to contact us with any questions they may have at [phone].         Mayra Linn MS, [facility] [place], [facility]  Licensed Certified Genetic Counselor       Cc: Cory Farias